# Patient Record
Sex: FEMALE
[De-identification: names, ages, dates, MRNs, and addresses within clinical notes are randomized per-mention and may not be internally consistent; named-entity substitution may affect disease eponyms.]

---

## 2020-06-01 ENCOUNTER — HOSPITAL ENCOUNTER (INPATIENT)
Dept: HOSPITAL 56 - MW.OB | Age: 30
LOS: 2 days | Discharge: HOME | End: 2020-06-03
Attending: OBSTETRICS & GYNECOLOGY | Admitting: OBSTETRICS & GYNECOLOGY
Payer: COMMERCIAL

## 2020-06-01 DIAGNOSIS — O30.043: Primary | ICD-10-CM

## 2020-06-01 DIAGNOSIS — Z3A.38: ICD-10-CM

## 2020-06-01 DIAGNOSIS — E66.9: ICD-10-CM

## 2020-06-01 DIAGNOSIS — O34.211: ICD-10-CM

## 2020-06-01 DIAGNOSIS — K21.9: ICD-10-CM

## 2020-06-01 DIAGNOSIS — F17.210: ICD-10-CM

## 2020-06-01 RX ADMIN — KETOROLAC TROMETHAMINE SCH MG: 30 INJECTION, SOLUTION INTRAMUSCULAR at 10:01

## 2020-06-01 RX ADMIN — KETOROLAC TROMETHAMINE SCH MG: 30 INJECTION, SOLUTION INTRAMUSCULAR at 15:21

## 2020-06-01 RX ADMIN — KETOROLAC TROMETHAMINE SCH MG: 30 INJECTION, SOLUTION INTRAMUSCULAR at 20:53

## 2020-06-01 NOTE — PCM.POSTAN
POST ANESTHESIA ASSESSMENT





- MENTAL STATUS


Mental Status: Alert, Oriented





- RESPIRATORY


Respiratory Status: Airway Patent, O2 Saturation Stable





- CARDIOVASCULAR


CV Status: Pulse Rate WNL, Blood Pressure Stable





- GASTROINTESTINAL


GI Status: No Symptoms





- POST OP HYDRATION


Hydration Status: Adequate & Stable

## 2020-06-01 NOTE — PCM.OPNOTE
- General Post-Op/Procedure Note


Date of Surgery/Procedure: 06/01/20


Operative Procedure(s): Repeat LTCS


Findings: 





Twin A viable female APGARs 8, 9 weight 7 lb 3 oz


Twin B viable male APGARs 7, 9 weight 6 lb


Pre Op Diagnosis: 38 week IUP.  Dichorionic/diamniotic twin gestation.  

Previous csection, desires repeat


Post-Op Diagnosis: Same


Anesthesia Technique: Spinal


Primary Surgeon: Tracey Denton


Assistant: Sudha Barahona


Pathology: 





Placenta


Fluid Replacement, Intraop: 1,000 (in OR)


EBL in mLs: 600


Complications: None known


Condition: Stable


Free Text/Narrative:: 


Dictation [id number] Intake & Output











 05/31/20 06/01/20 06/01/20





 22:59 06:59 14:59


 


Intake Total   1000


 


Balance   1000

## 2020-06-01 NOTE — OR
SURGEON:

Tracey Denton M.D.

 

DATE OF PROCEDURE:  2020

 

PREOPERATIVE DIAGNOSES:

1. A 38-week intrauterine pregnancy.

2. Dichorionic diamniotic twin gestation.

3. Previous  section, desires repeat.

 

POSTOPERATIVE DIAGNOSES:

1. A 38-week intrauterine pregnancy.

2. Dichorionic diamniotic twin gestation.

3. Previous  section, desires repeat.

 

PROCEDURE:

Repeat low-transverse  section.

 

ANESTHESIA:

Spinal.

 

ESTIMATED BLOOD LOSS:

600 mL.

 

COMPLICATIONS:

None known.

 

FINDINGS:

Twin A is a viable female, Apgar score of 8 at 1 minute and 9 at 5 minutes.

Weight of 7 pounds 3 ounces.  Twin B is a viable male, Apgar score of 7 at 1

minute, 9 at 5 minutes.  Weight of 6 pounds.  Delivery of intact dichorionic

placenta will be sent to pathology for further analysis.

 

DISPOSITION:

The patient to PACU.  Infants to  nursery.

 

PROCEDURE IN DETAIL:

Karolina is a 30-year-old female who presents today for scheduled repeat

 delivery.  Risks of procedure have been discussed.  Proper consent

obtained.

 

The patient taken to the operating room where she underwent spinal anesthetic,

was then placed in the dorsal supine position with leftward tilt.  SCDs to the

lower extremities.  [place] to gravity.  She was prepped and draped in usual

sterile fashion.  Received Ancef prophylactically.  Time-out was performed.

 

Anesthesia was tested and found to be adequate.  Previous Pfannenstiel scar was

now excised.  Subcutaneous tissue was incised down to the level of the rectus

fascia which was incised in midline, lateralized on either side sharply and

bluntly.  The superior aspect of fascia was tented upward, dissected sharply,

and bluntly away from underlying muscles.  In a similar aspect, this  was

performed in the inferior aspect of the fascia.  Rectus muscles were now

 in the midline sharply and bluntly.  The peritoneum was entered and

rectus muscle with peritoneum were now lateralized bluntly.  Uterine position

and fetal position palpated.  Uterovesical reflection visualized.  Bladder flap

was created sharply and bluntly.  Self-retaining retractor now gently placed.  A

low-transverse hysterotomy was now performed.  Uterine cavity was entered with

the blunt end of the scalpel.  Hysterotomy was lateralized bluntly.  Amniotomy

was performed, clear fluid returned.  The head of twin A was now secured and

delivered from the pelvis.  Fundal pressure was applied.  The head was delivered

followed by anterior shoulder, posterior shoulder, and remainder of the body

without difficulty.  The infant's oropharynx and nares were bulb suctioned. Cord

was clamped x2 and cut.  Infant was handed off to attending nursing staff

pediatrician.  Twin B's head was now delivered to the midline.  Amniotomy was

performed.  Clear fluid was returned.  The infant's head was delivered followed

by anterior shoulder, posterior shoulder, and remainder of the body without

difficulty.  The infant's oropharynx and nares were bulb suctioned. Cord was

clamped x2.  Infant was handed off to attending nursery staff and pediatrician.

Cord arterial, cord venous, cord blood samplings were obtained for both cords.

The placenta was now delivered and will be sent to pathology for further

analysis.  Uterine cavity was cleared of all clot and debris.  Hysterotomy

repaired using 0 Vicryl in continuous running locked fashion followed by a re-

imbricating layer.  Any areas of oozing were now cauterized along the serosa.

Uterus remained firm.  Posterior aspect of the uterus inspected.  No defects or

hematomas were found to be forming.  Region was well irrigated and suction

dried.  Colonic gutters were cleared of all clot and debris, well irrigated and

suction dried.  The hysterotomy was again inspected, found to be hemostatic.

Self-retaining retractor now gently removed.  Bladder blade retractor now placed

and hysterotomy once again inspected, found to be hemostatic.

 

Rectus muscles were reapproximated using 0 Vicryl with inverted mattress suture

technique.  There was an area of omental adhesion which was cauterized and lysed

and tied with a free tie and a pass.  Hemostasis appeared evident.

 

The anterior aspect of the muscle, posterior aspect of the fascia was closely

inspected.  Any areas of oozing were cauterized.  The rectus fascia was

reapproximated using 0 Vicryl in continuous running fashion beginning laterally

on either side meeting in midline with 0 Vicryl.  The subcutaneous tissue was

now well irrigated, suction dried, and any areas of oozing were cauterized.

Deep subcutaneous tissue was reapproximated using 3-0 plain in continuous

running fashion.  Once again copiously irrigated the incision and used cautery

for any areas of oozing.  Hemostasis appeared evident.  Skin edges were now

reapproximated using 3-0 Vicryl in subcuticular fashion followed by Dermabond

and a pressure dressing.  Sponge, instrument, and needle count were correct x2.

The uterus remained firm.  Hemostasis evident.  The patient tolerated the

procedure well overall, will go to PACU, infant to  nursery.

 

 

ERNESTO / FRANC

DD:  2020 09:02:52

DT:  2020 11:19:54

Job #:  [id number]

## 2020-06-01 NOTE — PCM.PREANE
Preanesthetic Assessment





- Anesthesia/Transfusion/Family Hx


Anesthesia History: Prior Anesthesia Without Reaction


Other Type of Anesthesia Reaction Comment: "woke up during tonsillectomy and 

wisdom teeth extraction"


Family History of Anesthesia Reaction: No


Transfusion History: No Prior Transfusion(s)





- Review of Systems


General: No Symptoms


Pulmonary: No Symptoms


Cardiovascular: No Symptoms


Gastrointestinal: No Symptoms


Neurological: No Symptoms


Other: Reports: None





- Physical Assessment


NPO Status Date: 20


Height: 5 ft 9 in


Weight: 119.295 kg


ASA Class: 2


Airway Class: Mallampati = 2


Dentition: Reports: Normal Dentition


ROM/Head Extension: Full


Lungs: Clear to Auscultation, Normal Respiratory Effort


Cardiovascular: Regular Rate, Regular Rhythm





- Lab


Values: 





 Laboratory Last Values











WBC  12.39 K/uL (4.0-11.0)  H  20  05:15    


 


RBC  3.96 M/uL (4.30-5.90)  L  20  05:15    


 


Hgb  11.9 g/dL (12.0-16.0)  L  20  05:15    


 


Hct  36.7 % (36.0-46.0)   20  05:15    


 


MCV  92.7 fL (80.0-98.0)   20  05:15    


 


MCH  30.1 pg (27.0-32.0)   20  05:15    


 


MCHC  32.4 g/dL (31.0-37.0)   20  05:15    


 


RDW Std Deviation  43.2 fl (28.0-62.0)   20  05:15    


 


RDW Coeff of Wilfrid  13 % (11.0-15.0)   20  05:15    


 


Plt Count  302 K/uL (150-400)   20  05:15    


 


MPV  11.20 fL (7.40-12.00)   20  05:15    


 


Nucleated RBC %  0.0 /100WBC  20  05:15    


 


Nucleated RBCs #  0 K/uL  20  05:15    


 


Blood Type  O POSITIVE   20  05:15    


 


Antibody Screen  NEGATIVE   20  05:15    














- Allergies


Allergies/Adverse Reactions: 


 Allergies











Allergy/AdvReac Type Severity Reaction Status Date / Time


 


erythromycin base Allergy  Rash Verified 20 10:15














- Blood


Blood Available: No





- Anesthesia Plan


Pre-Op Medication Ordered: None





- Acknowledgements


Anesthesia Type Planned: Spinal


Pt an Appropriate Candidate for the Planned Anesthesia: Yes


Alternatives and Risks of Anesthesia Discussed w Pt/Guardian: Yes


Pt/Guardian Understands and Agrees with Anesthesia Plan: Yes





PreAnesthesia Questionnaire


HEENT History: Reports: Other (See Below)


Other HEENT History: wears contacts/glasses, has 2 dental implants


Cardiovascular History: Reports: Other (See Below)


Other Cardiovascular History: HTN prior to gastric sleeve, none since .


Respiratory History: Reports: Sleep Apnea


Other Respiratory History: sleep apnea prior to gastric sleeve, not since 

weight loss


Gastrointestinal History: Reports: GERD, Other (See Below)


Other Gastrointestinal History: patient believes she has diverticulosis. 

Hemorrhoids occured this pregnancy.


Genitourinary History: Reports: None


OB/GYN History: Reports: Pregnancy, Other (See Below)


Other OB/BYN History: ovarian cyst that ruptured,  or .


Musculoskeletal History: Reports: None


Neurological History: Reports: None


Psychiatric History: Reports: Anxiety


Endocrine/Metabolic History: Reports: Obesity/BMI 30+, Other (See Below)


Other Endocrine/Metabolic History: diagnosed pre-diabetic prior to weight loss, 

was on a low dose of metformin, no longer an issue after weight loss.


Hematologic History: Reports: None


Immunologic History: Reports: None


Oncologic (Cancer) History: Reports: None


Dermatologic History: Reports: None





- Infectious Disease History


Infectious Disease History: Reports: Chicken Pox





- Past Surgical History


Head Surgeries/Procedures: Reports: None


HEENT Surgical History: Reports: Oral Surgery, Tonsillectomy, Other (See Below)


Other HEENT Surgeries/Procedures: wisdom teeth removal, approx. . 

Tonsillectomy, approx .


Cardiovascular Surgical History: Reports: None


Respiratory Surgical History: Reports: None


GI Surgical History: Reports: Bariatric Procedure


Other GI Surgeries/Procedures: gastric sleeve, 2017.


Female  Surgical History: Reports:  Section


Other Female  Surgeries/Procedures: c/section x2


Endocrine Surgical History: Reports: None


Neurological Surgical History: Reports: None


Musculoskeletal Surgical History: Reports: None


Oncologic Surgical History: Reports: None


Dermatological Surgical History: Reports: None





- SUBSTANCE USE


Smoking Status *Q: Current Every Day Smoker


Tobacco Use Within Last Twelve Months: Cigarettes


Second Hand Smoke Exposure: Yes


Recreational Drug Use History: No





- HOME MEDS


Home Medications: 


 Home Meds





Aspirin [Low Dose Aspirin EC] 81 mg PO DAILY 20 [History]


Omeprazole 20 mg PO DAILY 20 [History]


Pnv No.95/Ferrous Fum/Folic AC [Prenatal Vitamin Tablet] 1 tab PO DAILY  [History]


buPROPion HCL [Bupropion HCl Sr] 150 mg PO DAILY 20 [History]











- CURRENT (IN HOUSE) MEDS


Current Meds: 





 Current Medications





Bisacodyl (Dulcolax)  10 mg RECTAL ONETIME PRN


   PRN Reason: Constipation


Diphenhydramine HCl (Benadryl)  25 mg IVPUSH Q6H PRN


   PRN Reason: Itching or Nausea


Docusate Sodium (Colace)  100 mg PO BID [facility]


Emollient Ointment (Lansinoh Hpa)  0 gm TOP ASDIRECTED PRN


   PRN Reason: Sore Nipples


Lactated Ringer's (Ringers, Lactated)  1,000 mls @ 125 mls/hr IV ASDIRECTED [facility]


   Last Admin: 20 07:21 Dose:  999 mls/hr


Oxytocin/Lactated Ringer's (Pitocin In Lr 30 Units/500 Ml)  30 unit in 500 mls 

@ 999 mls/hr IV TITRATE SURYA; Protocol


Ibuprofen (Motrin)  800 mg PO Q8H PRN


   PRN Reason: mild pain or fever


Ketorolac Tromethamine (Toradol)  30 mg IVPUSH Q6H [facility]


   Stop: 20 04:46


Methylergonovine Maleate (Methergine)  0.2 mg IM ONETIME PRN


   PRN Reason: Excessive Vaginal Bleeding


Misoprostol (Cytotec)  1,000 mcg RECTAL ONETIME PRN


   PRN Reason: excessive bleeding


Ondansetron HCl (Zofran)  4 mg IVPUSH Q4H PRN


   PRN Reason: Nausea/Vomiting


Oxycodone/Acetaminophen (Percocet 325-5 Mg)  1 tab PO Q4H PRN


   PRN Reason: Pain (moderate 4-6)


Oxycodone/Acetaminophen (Percocet 325-5 Mg)  2 tab PO Q4H PRN


   PRN Reason: Pain (moderate 4-6)


Oxytocin (Pitocin)  10 unit IM ASDIRECTED PRN


   PRN Reason: Excessive Vaginal Bleeding





Discontinued Medications





Cefazolin Sodium/Dextrose (Ancef) Confirm Administered Dose 2 gm IV .STK-MED ONE


   Stop: 20 07:18


Dexamethasone (Dexamethasone) Confirm Administered Dose 20 mg .ROUTE .STK-MED 

ONE


   Stop: 20 07:16


Ephedrine Sulfate (Ephedrine Sulfate) Confirm Administered Dose 50 mg .ROUTE 

.STK-MED ONE


   Stop: 20 07:16


Fentanyl (Sublimaze) Confirm Administered Dose 100 mcg .ROUTE .STK-MED ONE


   Stop: 20 07:17


Cefazolin Sodium/Dextrose 2 gm (/ Premix)  50 mls @ 100 mls/hr IV ONETIME ONE


   Stop: 20 07:29


Sodium Chloride (Normal Saline) Confirm Administered Dose 20 mls @ as directed 

.ROUTE .STK-MED ONE


   Stop: 20 07:32


Ibuprofen (Motrin)  800 mg PO Q8H PRN


   PRN Reason: mild pain or fever


Morphine Sulfate (Duramorph Pf) Confirm Administered Dose 10 mg .ROUTE .STK-MED 

ONE


   Stop: 20 07:17


Ondansetron HCl (Zofran) Confirm Administered Dose 4 mg .ROUTE .STK-MED ONE


   Stop: 20 07:16


Oxytocin (Pitocin) Confirm Administered Dose 40 unit .ROUTE .STK-MED ONE


   Stop: 20 07:18

## 2020-06-02 RX ADMIN — OXYCODONE HYDROCHLORIDE AND ACETAMINOPHEN PRN TAB: 5; 325 TABLET ORAL at 14:15

## 2020-06-02 RX ADMIN — KETOROLAC TROMETHAMINE SCH MG: 30 INJECTION, SOLUTION INTRAMUSCULAR at 03:33

## 2020-06-02 RX ADMIN — KETOROLAC TROMETHAMINE SCH MG: 30 INJECTION, SOLUTION INTRAMUSCULAR at 09:17

## 2020-06-02 NOTE — [id number]
Post Anesthesia Note





- EVALUATION WITHIN 48HRS OF ANESTHETIC


Vital Signs in Normal Range: Yes


Patient Participated in Evaluation: Yes


Respiratory Function Stable: Yes


Airway Patent: Yes


Cardiovascular Function Stable: Yes


Hydration Status Stable: Yes


Pain Control Satisfactory: Yes


Nausea and Vomiting Control Satisfactory: Yes


Mental Status Recovered: Yes


Vital Signs: 


 Last Vital Signs











Temp  36.3 C   06/02/20 03:54


 


Pulse  67   06/02/20 03:54


 


Resp  18   06/02/20 07:00


 


BP  129/66   06/02/20 03:54


 


Pulse Ox  97   06/02/20 07:00

## 2020-06-02 NOTE — PCM.PNPP
- General Info


Date of Service: 20


Functional Status: Reports: Pain Controlled, Tolerating Diet, Ambulating, 

Urinating





- Review of Systems


General: Reports: Fatigue.  Denies: Fever, Weakness


Pulmonary: Denies: Shortness of Breath


Cardiovascular: Denies: Chest Pain, Palpitations, Lightheadedness


Gastrointestinal: Denies: Abdominal Pain, Nausea, Vomiting


Genitourinary: Denies: Flank Pain


Musculoskeletal: Reports: No Symptoms


Skin: Reports: No Symptoms


Neurological: Reports: No Symptoms


Psychiatric: Reports: No Symptoms





- General Info


Date of Service: 20





- Patient Data


Vital Signs - Most Recent: 


 Last Vital Signs











Temp  36.3 C   20 09:35


 


Pulse  67   20 09:35


 


Resp  16   20 09:35


 


BP  144/73 H  20 09:35


 


Pulse Ox  95   20 09:35











Weight - Most Recent: 119.295 kg


I&O - Last 24 Hours: 


 Intake & Output











 20





 22:59 06:59 14:59


 


Output Total 1850 1500 350


 


Balance -1850 -1500 -350











Lab Results - Last 24 Hours: 


 Laboratory Results - last 24 hr











  20 Range/Units





  05:49 


 


Hgb  10.8 L  (12.0-16.0)  g/dL


 


Hct  33.1 L  (36.0-46.0)  %











Med Orders - Current: 


 Current Medications





Bisacodyl (Dulcolax)  10 mg RECTAL ONETIME PRN


   PRN Reason: Constipation


Diphenhydramine HCl (Benadryl)  25 mg IVPUSH Q6H PRN


   PRN Reason: Itching or Nausea


Docusate Sodium (Colace)  100 mg PO BID [facility]


   Last Admin: 20 09:17 Dose:  100 mg


Emollient Ointment (Lansinoh Hpa)  0 gm TOP ASDIRECTED PRN


   PRN Reason: Sore Nipples


   Last Admin: 20 16:17 Dose:  1 tube


Tranexamic Acid 1,000 mg/ (Sodium Chloride)  110 mls @ 660 mls/hr IV ONETIME PRN


   PRN Reason: Bleeding


Lactated Ringer's (Ringers, Lactated)  1,000 mls @ 125 mls/hr IV ASDIRECTED [facility]


   Last Admin: 20 14:07 Dose:  125 mls/hr


Ibuprofen (Motrin)  800 mg PO Q8H PRN


   PRN Reason: mild pain or fever


Ibuprofen (Motrin)  800 mg PO Q8H PRN


   PRN Reason: mild pain or fever


Methylergonovine Maleate (Methergine)  0.2 mg IM ONETIME PRN


   PRN Reason: Excessive Vaginal Bleeding


Misoprostol (Cytotec)  1,000 mcg RECTAL ONETIME PRN


   PRN Reason: excessive bleeding


Ondansetron HCl (Zofran)  4 mg IVPUSH Q4H PRN


   PRN Reason: Nausea/Vomiting


Oxycodone/Acetaminophen (Percocet 325-5 Mg)  1 tab PO ONETIME PRN


   PRN Reason: Pain (moderate 4-6)


Oxycodone/Acetaminophen (Percocet 325-5 Mg)  1 tab PO Q4H PRN


   PRN Reason: Pain (moderate 4-6)


Oxycodone/Acetaminophen (Percocet 325-5 Mg)  2 tab PO Q4H PRN


   PRN Reason: Pain (moderate 4-6)


Oxytocin (Pitocin)  10 unit IM ASDIRECTED PRN


   PRN Reason: Excessive Vaginal Bleeding


Simethicone (Simethicone)  160 mg PO QID [facility]


   Last Admin: 20 12:08 Dose:  160 mg





Discontinued Medications





Bisacodyl (Dulcolax)  10 mg RECTAL ONETIME PRN


   PRN Reason: Constipation


Cefazolin Sodium/Dextrose (Ancef) Confirm Administered Dose 2 gm IV .STK-MED ONE


   Stop: 20 07:18


Dexamethasone (Dexamethasone) Confirm Administered Dose 20 mg .ROUTE .STK-MED 

ONE


   Stop: 20 07:16


Diphenhydramine HCl (Benadryl)  25 mg IVPUSH Q6H PRN


   PRN Reason: Itching or Nausea


Docusate Sodium (Colace)  100 mg PO BID [facility]


Emollient Ointment (Lansinoh Hpa)  0 gm TOP ASDIRECTED PRN


   PRN Reason: Sore Nipples


Ephedrine Sulfate (Ephedrine Sulfate) Confirm Administered Dose 50 mg .ROUTE 

.STK-MED ONE


   Stop: 20 07:16


Fentanyl (Sublimaze) Confirm Administered Dose 100 mcg .ROUTE .STK-MED ONE


   Stop: 20 07:17


Fentanyl (Sublimaze)  50 mcg IVPUSH Q5M PRN


   PRN Reason: Pain (severe 7-10)


   Stop: 20 08:29


Lactated Ringer's (Ringers, Lactated)  1,000 mls @ 125 mls/hr IV ASDIRECTED [facility]


   Last Admin: 20 07:21 Dose:  999 mls/hr


Oxytocin/Lactated Ringer's (Pitocin In Lr 30 Units/500 Ml)  30 unit in 500 mls 

@ 999 mls/hr IV TITRATE SURYA; Protocol


Cefazolin Sodium/Dextrose 2 gm (/ Premix)  50 mls @ 100 mls/hr IV ONETIME ONE


   Stop: 20 07:29


   Last Admin: 20 08:10 Dose:  Not Given


Sodium Chloride (Normal Saline) Confirm Administered Dose 20 mls @ as directed 

.ROUTE .STK-MED ONE


   Stop: 20 07:32


Ibuprofen (Motrin)  800 mg PO Q8H PRN


   PRN Reason: mild pain or fever


Ketorolac Tromethamine (Toradol)  30 mg IVPUSH Q6H [facility]


   Stop: 20 04:46


Ketorolac Tromethamine (Toradol)  30 mg IVPUSH Q6H [facility]


   Stop: 20 09:01


   Last Admin: 20 09:17 Dose:  30 mg


Methylergonovine Maleate (Methergine)  0.2 mg IM ONETIME PRN


   PRN Reason: Excessive Vaginal Bleeding


Misoprostol (Cytotec)  1,000 mcg RECTAL ONETIME PRN


   PRN Reason: excessive bleeding


Morphine Sulfate (Duramorph Pf) Confirm Administered Dose 10 mg .ROUTE .STK-MED 

ONE


   Stop: 20 07:17


Nalbuphine HCl (Nubain)  2.5 mg IVPUSH Q3H PRN


   PRN Reason: Pruritis


   Stop: 20 08:29


   Last Admin: 20 10:01 Dose:  2.5 mg


Octyl Cyanoacrylate (Dermabond Advance) Confirm Administered Dose 1 applic 

.ROUTE .STK-MED ONE


   Stop: 20 08:39


   Last Admin: 20 13:54 Dose:  Not Given


Ondansetron HCl (Zofran)  4 mg IVPUSH Q4H PRN


   PRN Reason: Nausea/Vomiting


Ondansetron HCl (Zofran) Confirm Administered Dose 4 mg .ROUTE .STK-MED ONE


   Stop: 20 07:16


Oxycodone/Acetaminophen (Percocet 325-5 Mg)  1 tab PO Q4H PRN


   PRN Reason: Pain (moderate 4-6)


Oxycodone/Acetaminophen (Percocet 325-5 Mg)  2 tab PO Q4H PRN


   PRN Reason: Pain (moderate 4-6)


Oxytocin (Pitocin)  10 unit IM ASDIRECTED PRN


   PRN Reason: Excessive Vaginal Bleeding


Oxytocin (Pitocin) Confirm Administered Dose 40 unit .ROUTE .STK-MED ONE


   Stop: 20 07:18











- Infant Interaction


Support Person: 





- Postpartum Recovery Exam


Fundal Tone: Firm


Fundal Level: At Umbilicus


Fundal Placement: Midline


Lochia Amount: Small


Lochia Color: Rubra/Red


Perineum Description: Intact, Minimal Bruising/Swelling


Episiotomy/Laceration: None


Bladder Status: Voiding


Urinary Elimination: Indwelling Catheter





- Exam


General: Alert, Oriented


Lungs: Normal Respiratory Effort


Cardiovascular: Regular Rate, Regular Rhythm


GI/Abdominal Exam: Normal Bowel Sounds, Soft


Extremities: Pedal Edema (trace).  No: Florence's Sign


Skin: Warm, Dry, Intact


Wound/Incisions: Dressing Dry and Intact


Neurological: No New Focal Deficit


Psy/Mental Status: Alert, Normal Affect





- Problem List & Annotations


(1) S/P repeat low transverse 


SNOMED Code(s): 747330881, 07164019, 508680272, 704224540, 872006718


   Code(s): Z98.891 - HISTORY OF UTERINE SCAR FROM PREVIOUS SURGERY   Status: 

Acute   Current Visit: Yes   





- Problem List Review


Problem List Initiated/Reviewed/Updated: Yes





- My Orders


Last 24 Hours: 


My Active Orders





20 12:00


Simethicone   160 mg PO QID 





20 11:00


Ibuprofen [Motrin]   800 mg PO Q8H PRN 














- Assessment


Assessment:: 





POD 1 status post repeat LTCS


Doing well overall, continue postpartum cares.  Working with feeding twins. 

Supplementing with formula.

## 2020-06-03 RX ADMIN — OXYCODONE HYDROCHLORIDE AND ACETAMINOPHEN PRN TAB: 5; 325 TABLET ORAL at 06:16

## 2020-06-03 RX ADMIN — OXYCODONE HYDROCHLORIDE AND ACETAMINOPHEN PRN TAB: 5; 325 TABLET ORAL at 12:14

## 2020-06-03 NOTE — PCM.PNPP
- General Info


Date of Service: 20


Functional Status: Reports: Pain Controlled, Tolerating Diet, Ambulating, 

Urinating





- Review of Systems


General: Reports: Fatigue.  Denies: Fever, Weakness


Pulmonary: Denies: Shortness of Breath


Cardiovascular: Denies: Chest Pain, Palpitations, Lightheadedness


Gastrointestinal: Denies: Abdominal Pain, Nausea, Vomiting


Genitourinary: Denies: Flank Pain


Musculoskeletal: Reports: No Symptoms


Skin: Reports: No Symptoms


Neurological: Reports: No Symptoms


Psychiatric: Reports: No Symptoms





- General Info


Date of Service: 20





- Patient Data


Vital Signs - Most Recent: 


 Last Vital Signs











Temp  36.3 C   20 08:00


 


Pulse  58 L  20 04:00


 


Resp  14   20 08:00


 


BP  124/79   20 08:00


 


Pulse Ox  97   20 08:00











Weight - Most Recent: 119.295 kg


Med Orders - Current: 


 Current Medications





Bisacodyl (Dulcolax)  10 mg RECTAL ONETIME PRN


   PRN Reason: Constipation


Diphenhydramine HCl (Benadryl)  25 mg IVPUSH Q6H PRN


   PRN Reason: Itching or Nausea


Docusate Sodium (Colace)  100 mg PO BID [facility]


   Last Admin: 20 08:36 Dose:  100 mg


Emollient Ointment (Lansinoh Hpa)  0 gm TOP ASDIRECTED PRN


   PRN Reason: Sore Nipples


   Last Admin: 20 16:17 Dose:  1 tube


Tranexamic Acid 1,000 mg/ (Sodium Chloride)  110 mls @ 660 mls/hr IV ONETIME PRN


   PRN Reason: Bleeding


Lactated Ringer's (Ringers, Lactated)  1,000 mls @ 125 mls/hr IV ASDIRECTED [facility]


   Last Admin: 20 14:07 Dose:  125 mls/hr


Ibuprofen (Motrin)  800 mg PO Q8H PRN


   PRN Reason: mild pain or fever


   Last Admin: 20 08:36 Dose:  800 mg


Ibuprofen (Motrin)  800 mg PO Q8H PRN


   PRN Reason: mild pain or fever


   Last Admin: 20 03:44 Dose:  800 mg


Methylergonovine Maleate (Methergine)  0.2 mg IM ONETIME PRN


   PRN Reason: Excessive Vaginal Bleeding


Misoprostol (Cytotec)  1,000 mcg RECTAL ONETIME PRN


   PRN Reason: excessive bleeding


Ondansetron HCl (Zofran)  4 mg IVPUSH Q4H PRN


   PRN Reason: Nausea/Vomiting


Oxycodone/Acetaminophen (Percocet 325-5 Mg)  1 tab PO ONETIME PRN


   PRN Reason: Pain (moderate 4-6)


   Last Admin: 20 23:40 Dose:  1 tab


Oxycodone/Acetaminophen (Percocet 325-5 Mg)  1 tab PO Q4H PRN


   PRN Reason: Pain (moderate 4-6)


   Last Admin: 20 06:16 Dose:  1 tab


Oxycodone/Acetaminophen (Percocet 325-5 Mg)  2 tab PO Q4H PRN


   PRN Reason: Pain (moderate 4-6)


Oxytocin (Pitocin)  10 unit IM ASDIRECTED PRN


   PRN Reason: Excessive Vaginal Bleeding


Simethicone (Simethicone)  160 mg PO QID [facility]


   Last Admin: 20 06:16 Dose:  160 mg





Discontinued Medications





Bisacodyl (Dulcolax)  10 mg RECTAL ONETIME PRN


   PRN Reason: Constipation


Cefazolin Sodium/Dextrose (Ancef) Confirm Administered Dose 2 gm IV .STK-MED ONE


   Stop: 20 07:18


Dexamethasone (Dexamethasone) Confirm Administered Dose 20 mg .ROUTE .STK-MED 

ONE


   Stop: 20 07:16


Diphenhydramine HCl (Benadryl)  25 mg IVPUSH Q6H PRN


   PRN Reason: Itching or Nausea


Docusate Sodium (Colace)  100 mg PO BID [facility]


Emollient Ointment (Lansinoh Hpa)  0 gm TOP ASDIRECTED PRN


   PRN Reason: Sore Nipples


Ephedrine Sulfate (Ephedrine Sulfate) Confirm Administered Dose 50 mg .ROUTE 

.STK-MED ONE


   Stop: 20 07:16


Fentanyl (Sublimaze) Confirm Administered Dose 100 mcg .ROUTE .STK-MED ONE


   Stop: 20 07:17


Fentanyl (Sublimaze)  50 mcg IVPUSH Q5M PRN


   PRN Reason: Pain (severe 7-10)


   Stop: 20 08:29


Lactated Ringer's (Ringers, Lactated)  1,000 mls @ 125 mls/hr IV ASDIRECTED [facility]


   Last Admin: 20 07:21 Dose:  999 mls/hr


Oxytocin/Lactated Ringer's (Pitocin In Lr 30 Units/500 Ml)  30 unit in 500 mls 

@ 999 mls/hr IV TITRATE SURYA; Protocol


Cefazolin Sodium/Dextrose 2 gm (/ Premix)  50 mls @ 100 mls/hr IV ONETIME ONE


   Stop: 20 07:29


   Last Admin: 20 08:10 Dose:  Not Given


Sodium Chloride (Normal Saline) Confirm Administered Dose 20 mls @ as directed 

.ROUTE .STK-MED ONE


   Stop: 20 07:32


Ibuprofen (Motrin)  800 mg PO Q8H PRN


   PRN Reason: mild pain or fever


Ketorolac Tromethamine (Toradol)  30 mg IVPUSH Q6H [facility]


   Stop: 20 04:46


Ketorolac Tromethamine (Toradol)  30 mg IVPUSH Q6H [facility]


   Stop: 20 09:01


   Last Admin: 20 09:17 Dose:  30 mg


Methylergonovine Maleate (Methergine)  0.2 mg IM ONETIME PRN


   PRN Reason: Excessive Vaginal Bleeding


Misoprostol (Cytotec)  1,000 mcg RECTAL ONETIME PRN


   PRN Reason: excessive bleeding


Morphine Sulfate (Duramorph Pf) Confirm Administered Dose 10 mg .ROUTE .STK-MED 

ONE


   Stop: 20 07:17


Nalbuphine HCl (Nubain)  2.5 mg IVPUSH Q3H PRN


   PRN Reason: Pruritis


   Stop: 20 08:29


   Last Admin: 20 10:01 Dose:  2.5 mg


Octyl Cyanoacrylate (Dermabond Advance) Confirm Administered Dose 1 applic 

.ROUTE .STK-MED ONE


   Stop: 20 08:39


   Last Admin: 20 13:54 Dose:  Not Given


Ondansetron HCl (Zofran)  4 mg IVPUSH Q4H PRN


   PRN Reason: Nausea/Vomiting


Ondansetron HCl (Zofran) Confirm Administered Dose 4 mg .ROUTE .STK-MED ONE


   Stop: 20 07:16


Oxycodone/Acetaminophen (Percocet 325-5 Mg)  1 tab PO Q4H PRN


   PRN Reason: Pain (moderate 4-6)


Oxycodone/Acetaminophen (Percocet 325-5 Mg)  2 tab PO Q4H PRN


   PRN Reason: Pain (moderate 4-6)


Oxytocin (Pitocin)  10 unit IM ASDIRECTED PRN


   PRN Reason: Excessive Vaginal Bleeding


Oxytocin (Pitocin) Confirm Administered Dose 40 unit .ROUTE .STK-MED ONE


   Stop: 20 07:18











- Infant Interaction


Support Person: 





- Postpartum Recovery Exam


Fundal Tone: Firm


Fundal Level: At Umbilicus


Fundal Placement: Midline


Lochia Amount: Small


Lochia Color: Rubra/Red


Perineum Description: Intact, Minimal Bruising/Swelling


Episiotomy/Laceration: None


Bladder Status: Voiding


Urinary Elimination: Voided





- Exam


General: Alert, Oriented


Lungs: Normal Respiratory Effort


Cardiovascular: Regular Rate, Regular Rhythm


GI/Abdominal Exam: Normal Bowel Sounds, Soft


Extremities: Pedal Edema (trace).  No: Florence's Sign


Skin: Warm, Dry, Intact, Rash (appears to be reacting to elastic from binder--

asked her not to use this)


Wound/Incisions: Healing Well, No Drainage.  No: Erythema


Neurological: No New Focal Deficit


Psy/Mental Status: Alert, Normal Affect, Normal Mood





- Problem List & Annotations


(1) S/P repeat low transverse 


SNOMED Code(s): 057368630, 73090081, 596890920, [id number], 252297775


   Code(s): Z98.891 - HISTORY OF UTERINE SCAR FROM PREVIOUS SURGERY   Status: 

Acute   Current Visit: Yes   





- Problem List Review


Problem List Initiated/Reviewed/Updated: Yes





- My Orders


Last 24 Hours: 


My Active Orders





20 11:00


Ibuprofen [Motrin]   800 mg PO Q8H PRN 





20 10:21


Ready for Discharge [RC] PER UNIT ROUTINE 














- Assessment


Assessment:: 





POD 2 status post repeat LTCS








- Plan


Plan:: 


Doing well overall, breastfeeding and supplementing with formula.  Awaiting 

baby boy twin bilirubin. If twins are able to be discharged to home, patient 

feels ready to go.  Discharge instructions reviewed. Follow up at [facility] 2 and 6 

weeks.  Infection and bleeding warnings reviewed.  Discharge to home today.